# Patient Record
Sex: FEMALE | Race: WHITE | NOT HISPANIC OR LATINO | URBAN - METROPOLITAN AREA
[De-identification: names, ages, dates, MRNs, and addresses within clinical notes are randomized per-mention and may not be internally consistent; named-entity substitution may affect disease eponyms.]

---

## 2019-12-23 ENCOUNTER — APPOINTMENT (OUTPATIENT)
Dept: URBAN - METROPOLITAN AREA CLINIC 211 | Age: 74
Setting detail: DERMATOLOGY
End: 2019-12-27

## 2019-12-23 DIAGNOSIS — Z41.9 ENCOUNTER FOR PROCEDURE FOR PURPOSES OTHER THAN REMEDYING HEALTH STATE, UNSPECIFIED: ICD-10-CM

## 2019-12-23 PROBLEM — E13.9 OTHER SPECIFIED DIABETES MELLITUS WITHOUT COMPLICATIONS: Status: ACTIVE | Noted: 2019-12-23

## 2019-12-23 PROCEDURE — OTHER OTHER (COSMETIC): OTHER

## 2019-12-23 PROCEDURE — OTHER MIPS QUALITY: OTHER

## 2019-12-23 NOTE — PROCEDURE: MIPS QUALITY
Quality 130: Documentation Of Current Medications In The Medical Record: Current Medications Documented
Quality 110: Preventive Care And Screening: Influenza Immunization: Influenza Immunization previously received during influenza season
Quality 131: Pain Assessment And Follow-Up: Pain assessment documented as positive using a standardized tool AND a follow-up plan is documented
Quality 431: Preventive Care And Screening: Unhealthy Alcohol Use - Screening: Patient screened for unhealthy alcohol use using a single question and scores less than 2 times per year
Quality 474: Zoster Vaccination Status: Shingrix Vaccination not Administered or Previously Received, Reason not Otherwise Specified
Quality 111:Pneumonia Vaccination Status For Older Adults: Pneumococcal Vaccination Previously Received
Quality 400a: One-Time Screening For Hepatitis C Virus (Hcv) For All Patients: One-time screening for HCV infection not received within 12 month period and no documentation of prior screening, reasont not given.
Detail Level: Detailed

## 2019-12-23 NOTE — PROCEDURE: OTHER (COSMETIC)
Other (Free Text): Cosmetic Consultation\\nPE:sebaceous hyperplasia on forehead and a couple on lower face, Milia on cheeks, forehead and chin, sl’s and SK’s on lateral cheeks, one large SL on right cheek and a larger thicker SK on left cheek, uneven texture and overall dullness to skin\\n\\nPLAN:\\n\\n1.  Rec electrocautery for the SHs on face.  The procedure was reviewed in detail, r&b andpost tx expectations.  CQ was given.\\n\\n2.  Rec extractions for the Milia.  The procedure was reviewed in detail, r&b and post tx expectations.  CQ was given.\\n\\n3.  Rec IPL to partial face with possible layering of a focal chemical peel t9 select lesion.  Discussed that a second tx may be needed to help reach desired outcome.  The procedure was reviewed in detail, r&b and post tx expectations.  Informational handout given with a cq.\\n\\n4.  Rec pt introduce tretinoin 0.05% starting 2 night s a week and increase a night a week as tolerate duntil using nightly.  The product was reviewed in detail, r&b and rationale was provided.\\n\\n5.  Rec pt introduce regular facials every 4-6 weeks to help enhance her at home tx regimen.  Rec pt begin with the Silk Peel.  The treatment was reviewed in detail, r&b and post tx expectations.  Informational handout given with a cq.\\n\\nNOTE:\\n\\Yelena indications, treatment expectations (including management of any possible irritations), protocols, risks and benefits, pre/post care are reviewed.  Patient understands that multiple treatments may be necessary for optimum results and that on going maintenance with at-home products and additional office visits or treatments may be needed to enhance and extend the desired results.\\n\\Yelena questions were addressed.\\n\\nRTC-tbd
Detail Level: Zone

## 2019-12-23 NOTE — HPI: OTHER
Condition:: Cosmetic
Please Describe Your Condition:: Pt is interested in tx options for bumps on her face, browns and rough texture.  Pt is Cleansing with a Susan product, uses a Susan Serum and moisturizer.  Pt did not know the names of her medications, although time was spent reviewing them.  Allergies were reviewed and medical hx were reviewed.

## 2020-03-04 ENCOUNTER — APPOINTMENT (OUTPATIENT)
Dept: URBAN - METROPOLITAN AREA CLINIC 211 | Age: 75
Setting detail: DERMATOLOGY
End: 2020-03-05

## 2020-03-04 DIAGNOSIS — Z41.9 ENCOUNTER FOR PROCEDURE FOR PURPOSES OTHER THAN REMEDYING HEALTH STATE, UNSPECIFIED: ICD-10-CM

## 2020-03-04 PROCEDURE — OTHER OTHER (COSMETIC): OTHER

## 2020-03-04 PROCEDURE — OTHER MIPS QUALITY: OTHER

## 2020-03-04 NOTE — HPI: OTHER
Condition:: IPL
Please Describe Your Condition:: comes in for a chief complaint of dark pigmentation. Patient denied history of HVS and recent sun exposure. Patients medical history reviewed

## 2020-03-04 NOTE — PROCEDURE: MIPS QUALITY
Quality 431: Preventive Care And Screening: Unhealthy Alcohol Use - Screening: Patient screened for unhealthy alcohol use using a single question and scores less than 2 times per year
Quality 400a: One-Time Screening For Hepatitis C Virus (Hcv) For All Patients: One-time screening for HCV infection not received within 12 month period and no documentation of prior screening, reasont not given.
Quality 130: Documentation Of Current Medications In The Medical Record: Current Medications Documented
Detail Level: Detailed
Quality 111:Pneumonia Vaccination Status For Older Adults: Pneumococcal Vaccination Previously Received
Quality 110: Preventive Care And Screening: Influenza Immunization: Influenza Immunization previously received during influenza season
Quality 474: Zoster Vaccination Status: Shingrix Vaccination not Administered or Previously Received, Reason not Otherwise Specified
Quality 131: Pain Assessment And Follow-Up: Pain assessment documented as positive using a standardized tool AND a follow-up plan is documented

## 2020-03-04 NOTE — PROCEDURE: OTHER (COSMETIC)
Detail Level: Zone
Other (Free Text): IPL utilizing Max G\\n\\nPE: Solar lentiginies  \\n\\nIndication: improvement of pigmentation and reduction of vascular lesions\\n\\nPrior to treatment, all but not limited to treatment indications and expectations(including management of any possible irritations) protocols, risks and benefits were reviewed in detail, including post treatment expectations. All questions were answered prior to administering the treatment.\\n\\nTreatment # 1\\n\\nSite(s): Partial face \\n\\nSettings:\\n\\nApplied  20ms @  40j x 1 pass\\n\\nPre, nikkie and post cooling was applied utilizing the  and/or the Josefa Chiller. A moisturizing sunblock was applied post treatment. Patient tolerated the procedure well without immediate complication. \\n\\nPatient understands that multiple treatments may be necessary for optimum results and that on going maintenance with at-home products and additional office visits or treatments may be needed to enhance and extend the desired results.\\n\\nStandard protocol was applied. The eyes were covered with IPL specific eye shields. Following treatment, the expected mild erythema and edema was observed. Post care was reviewed and a follow up appointment was recommended.\\n\\Yelena questions were addressed.\\n\\nRTC:

## 2020-06-04 ENCOUNTER — APPOINTMENT (OUTPATIENT)
Dept: URBAN - METROPOLITAN AREA CLINIC 211 | Age: 75
Setting detail: DERMATOLOGY
End: 2020-06-05

## 2020-06-04 DIAGNOSIS — Z41.9 ENCOUNTER FOR PROCEDURE FOR PURPOSES OTHER THAN REMEDYING HEALTH STATE, UNSPECIFIED: ICD-10-CM

## 2020-06-04 PROBLEM — E03.9 HYPOTHYROIDISM, UNSPECIFIED: Status: ACTIVE | Noted: 2020-06-04

## 2020-06-04 PROBLEM — L55.1 SUNBURN OF SECOND DEGREE: Status: ACTIVE | Noted: 2020-06-04

## 2020-06-04 PROCEDURE — OTHER OTHER (COSMETIC): OTHER

## 2020-06-04 PROCEDURE — OTHER MIPS QUALITY: OTHER

## 2020-06-04 NOTE — PROCEDURE: OTHER (COSMETIC)
Other (Free Text): Cosmetic:\\nPlan: IPL Follow Up \\n\\nPE: improvement in SL’s and SK’s\\n\\nPlan: \\n\\n1. After showing pt before photos and looking together in a mirror today, she agreed that there was improvement on both sides of face.  \\n\\n2. Discussed that pt can continue to use Retin A to help further enhance the results she was looking for.  Also recommended adding a topical HQ to skin routine.  Pt verbalized understanding \\n\\n3.\\n\\nNotes:\\n\\n\\Yelena indications, treatment expectations (including management of any possible irritations}, protocols, risks and benefits, pre/post care was reviewed. Details of these can be found on the informed consent documentation. Patient understands that multiple treatments may be necessary for optimum results, and that ongoing maintenance with skin care products and additional office visit or treatments may be needed to enhance and extend desired results. \\n\\n\\nAnswered al questions. \\n\\nRTC: PRN
Detail Level: Zone

## 2020-06-04 NOTE — HPI: OTHER
Condition:: Cosmetic
Please Describe Your Condition:: is being seen for a chief complaint of Cosmetic. Pt presents for IPL f/u to partial face.  Pt states she is disappointed with the results.  Pt state she feels the right side of face maybe improved, but the left did not.  Pt states she cannot recall what her skin looked like several days after the treatment.  She further said that she was under the impression \"they would be all gone.\" Pt states she has been using a Retin A on her face pre and post treatment.  Medications, allergies and medical history were reviewed with patient

## 2020-06-04 NOTE — PROCEDURE: MIPS QUALITY
Quality 474: Zoster Vaccination Status: Shingrix Vaccination not Administered or Previously Received, Reason not Otherwise Specified
Quality 131: Pain Assessment And Follow-Up: Pain assessment documented as positive using a standardized tool AND a follow-up plan is documented
Quality 110: Preventive Care And Screening: Influenza Immunization: Influenza Immunization previously received during influenza season
Detail Level: Detailed
Quality 400a: One-Time Screening For Hepatitis C Virus (Hcv) For All Patients: One-time screening for HCV infection not received within 12 month period and no documentation of prior screening, reasont not given.
Quality 111:Pneumonia Vaccination Status For Older Adults: Pneumococcal Vaccination Previously Received
Quality 130: Documentation Of Current Medications In The Medical Record: Current Medications Documented
Quality 431: Preventive Care And Screening: Unhealthy Alcohol Use - Screening: Patient screened for unhealthy alcohol use using a single question and scores less than 2 times per year